# Patient Record
Sex: FEMALE | Race: BLACK OR AFRICAN AMERICAN | NOT HISPANIC OR LATINO | URBAN - METROPOLITAN AREA
[De-identification: names, ages, dates, MRNs, and addresses within clinical notes are randomized per-mention and may not be internally consistent; named-entity substitution may affect disease eponyms.]

---

## 2023-10-11 ENCOUNTER — LAB (OUTPATIENT)
Dept: LAB | Facility: LAB | Age: 73
End: 2023-10-11
Payer: COMMERCIAL

## 2023-10-11 DIAGNOSIS — E78.2 MIXED HYPERLIPIDEMIA: ICD-10-CM

## 2023-10-11 DIAGNOSIS — M06.9 RHEUMATOID ARTHRITIS, UNSPECIFIED (MULTI): Primary | ICD-10-CM

## 2023-10-11 PROCEDURE — 36415 COLL VENOUS BLD VENIPUNCTURE: CPT

## 2023-10-11 PROCEDURE — 80061 LIPID PANEL: CPT

## 2023-10-11 PROCEDURE — 80053 COMPREHEN METABOLIC PANEL: CPT

## 2023-10-11 PROCEDURE — 85025 COMPLETE CBC W/AUTO DIFF WBC: CPT

## 2023-10-12 LAB
ALBUMIN SERPL BCP-MCNC: 4.2 G/DL (ref 3.4–5)
ALP SERPL-CCNC: 104 U/L (ref 33–136)
ALT SERPL W P-5'-P-CCNC: 12 U/L (ref 7–45)
ANION GAP SERPL CALC-SCNC: 14 MMOL/L (ref 10–20)
AST SERPL W P-5'-P-CCNC: 12 U/L (ref 9–39)
BASOPHILS # BLD AUTO: 0.07 X10*3/UL (ref 0–0.1)
BASOPHILS NFR BLD AUTO: 0.9 %
BILIRUB SERPL-MCNC: 0.8 MG/DL (ref 0–1.2)
BUN SERPL-MCNC: 17 MG/DL (ref 6–23)
CALCIUM SERPL-MCNC: 9.4 MG/DL (ref 8.6–10.6)
CHLORIDE SERPL-SCNC: 101 MMOL/L (ref 98–107)
CHOLEST SERPL-MCNC: 257 MG/DL (ref 0–199)
CHOLESTEROL/HDL RATIO: 3.4
CO2 SERPL-SCNC: 26 MMOL/L (ref 21–32)
CREAT SERPL-MCNC: 0.47 MG/DL (ref 0.5–1.05)
EOSINOPHIL # BLD AUTO: 0.23 X10*3/UL (ref 0–0.4)
EOSINOPHIL NFR BLD AUTO: 2.8 %
ERYTHROCYTE [DISTWIDTH] IN BLOOD BY AUTOMATED COUNT: 14.1 % (ref 11.5–14.5)
GFR SERPL CREATININE-BSD FRML MDRD: >90 ML/MIN/1.73M*2
GLUCOSE SERPL-MCNC: 81 MG/DL (ref 74–99)
HCT VFR BLD AUTO: 41.8 % (ref 36–46)
HDLC SERPL-MCNC: 75.9 MG/DL
HGB BLD-MCNC: 13.1 G/DL (ref 12–16)
IMM GRANULOCYTES # BLD AUTO: 0.04 X10*3/UL (ref 0–0.5)
IMM GRANULOCYTES NFR BLD AUTO: 0.5 % (ref 0–0.9)
LDLC SERPL CALC-MCNC: 164 MG/DL (ref 140–190)
LYMPHOCYTES # BLD AUTO: 2.43 X10*3/UL (ref 0.8–3)
LYMPHOCYTES NFR BLD AUTO: 29.9 %
MCH RBC QN AUTO: 30.9 PG (ref 26–34)
MCHC RBC AUTO-ENTMCNC: 31.3 G/DL (ref 32–36)
MCV RBC AUTO: 99 FL (ref 80–100)
MONOCYTES # BLD AUTO: 0.78 X10*3/UL (ref 0.05–0.8)
MONOCYTES NFR BLD AUTO: 9.6 %
NEUTROPHILS # BLD AUTO: 4.57 X10*3/UL (ref 1.6–5.5)
NEUTROPHILS NFR BLD AUTO: 56.3 %
NON HDL CHOLESTEROL: 181 MG/DL (ref 0–149)
NRBC BLD-RTO: 0 /100 WBCS (ref 0–0)
PLATELET # BLD AUTO: 376 X10*3/UL (ref 150–450)
PMV BLD AUTO: 10.5 FL (ref 7.5–11.5)
POTASSIUM SERPL-SCNC: 4.1 MMOL/L (ref 3.5–5.3)
PROT SERPL-MCNC: 7.5 G/DL (ref 6.4–8.2)
RBC # BLD AUTO: 4.24 X10*6/UL (ref 4–5.2)
SODIUM SERPL-SCNC: 137 MMOL/L (ref 136–145)
TRIGL SERPL-MCNC: 88 MG/DL (ref 0–149)
VLDL: 18 MG/DL (ref 0–40)
WBC # BLD AUTO: 8.1 X10*3/UL (ref 4.4–11.3)

## 2024-08-07 ENCOUNTER — HOSPITAL ENCOUNTER (OUTPATIENT)
Dept: RADIOLOGY | Facility: CLINIC | Age: 74
Discharge: HOME | End: 2024-08-07
Payer: COMMERCIAL

## 2024-08-07 DIAGNOSIS — Z12.31 ENCOUNTER FOR SCREENING MAMMOGRAM FOR MALIGNANT NEOPLASM OF BREAST: ICD-10-CM

## 2024-08-07 PROCEDURE — 77067 SCR MAMMO BI INCL CAD: CPT

## 2025-05-28 ENCOUNTER — OFFICE VISIT (OUTPATIENT)
Dept: URGENT CARE | Age: 75
End: 2025-05-28
Payer: COMMERCIAL

## 2025-05-28 VITALS
SYSTOLIC BLOOD PRESSURE: 133 MMHG | WEIGHT: 193 LBS | TEMPERATURE: 97.9 F | OXYGEN SATURATION: 98 % | DIASTOLIC BLOOD PRESSURE: 84 MMHG | HEART RATE: 89 BPM | RESPIRATION RATE: 16 BRPM

## 2025-05-28 DIAGNOSIS — M25.561 ACUTE PAIN OF RIGHT KNEE: ICD-10-CM

## 2025-05-28 DIAGNOSIS — S83.91XA SPRAIN OF RIGHT KNEE, UNSPECIFIED LIGAMENT, INITIAL ENCOUNTER: Primary | ICD-10-CM

## 2025-05-28 PROCEDURE — 1125F AMNT PAIN NOTED PAIN PRSNT: CPT | Performed by: PHYSICIAN ASSISTANT

## 2025-05-28 PROCEDURE — 99203 OFFICE O/P NEW LOW 30 MIN: CPT | Performed by: PHYSICIAN ASSISTANT

## 2025-05-28 PROCEDURE — 1160F RVW MEDS BY RX/DR IN RCRD: CPT | Performed by: PHYSICIAN ASSISTANT

## 2025-05-28 PROCEDURE — 1159F MED LIST DOCD IN RCRD: CPT | Performed by: PHYSICIAN ASSISTANT

## 2025-05-28 RX ORDER — NALOXONE HYDROCHLORIDE 4 MG/.1ML
4 SPRAY NASAL
COMMUNITY
Start: 2024-09-04 | End: 2025-09-04

## 2025-05-28 RX ORDER — ALBUTEROL SULFATE 90 UG/1
AEROSOL, METERED RESPIRATORY (INHALATION)
COMMUNITY
Start: 2023-10-10

## 2025-05-28 RX ORDER — TIZANIDINE 4 MG/1
4 TABLET ORAL
COMMUNITY
Start: 2025-03-18

## 2025-05-28 RX ORDER — DULOXETIN HYDROCHLORIDE 60 MG/1
60 CAPSULE, DELAYED RELEASE ORAL NIGHTLY
COMMUNITY

## 2025-05-28 RX ORDER — ATORVASTATIN CALCIUM 40 MG/1
40 TABLET, FILM COATED ORAL DAILY
COMMUNITY

## 2025-05-28 RX ORDER — GABAPENTIN 300 MG/1
300 CAPSULE ORAL 2 TIMES DAILY
COMMUNITY

## 2025-05-28 RX ORDER — FOLIC ACID 0.8 MG
0.8 TABLET ORAL DAILY
COMMUNITY

## 2025-05-28 RX ORDER — METHOTREXATE 2.5 MG/1
15 TABLET ORAL
COMMUNITY

## 2025-05-28 RX ORDER — LORATADINE 10 MG/1
10 TABLET ORAL
COMMUNITY
Start: 2025-04-08 | End: 2025-07-07

## 2025-05-28 RX ORDER — IBUPROFEN 800 MG/1
800 TABLET, FILM COATED ORAL 2 TIMES DAILY
COMMUNITY
Start: 2023-11-08 | End: 2025-07-02

## 2025-05-28 RX ORDER — OXYCODONE AND ACETAMINOPHEN 5; 325 MG/1; MG/1
TABLET ORAL
COMMUNITY

## 2025-05-28 RX ORDER — LOTEPREDNOL ETABONATE 5 MG/ML
1 SUSPENSION/ DROPS OPHTHALMIC 4 TIMES DAILY
COMMUNITY
Start: 2024-05-29

## 2025-05-28 ASSESSMENT — ENCOUNTER SYMPTOMS
DEPRESSION: 0
LOSS OF SENSATION IN FEET: 0
OCCASIONAL FEELINGS OF UNSTEADINESS: 1

## 2025-05-28 ASSESSMENT — PATIENT HEALTH QUESTIONNAIRE - PHQ9
SUM OF ALL RESPONSES TO PHQ9 QUESTIONS 1 AND 2: 0
1. LITTLE INTEREST OR PLEASURE IN DOING THINGS: NOT AT ALL
2. FEELING DOWN, DEPRESSED OR HOPELESS: NOT AT ALL

## 2025-05-28 ASSESSMENT — PAIN SCALES - GENERAL: PAINLEVEL_OUTOF10: 10-WORST PAIN EVER

## 2025-05-28 NOTE — PATIENT INSTRUCTIONS
You were seen for knee pain    X-ray:   Pending.  I will notify you of any abnormal x-ray results.        Plan  1. Please go to the [facility] radiology department across the street for x-ray today.  2. Take Tylenol  as needed for pain.  3. Wear ace wrap or splint for comfort and support.  4. RICE therapy: Rest, ice (apply ice to area 4-5 times a day for 20 minutes at a time), compression, elevation.    5. Follow-up with orthopedics as soon as possible for continued pain longer than one week.  6. Please return to ER if you develop new or worsening symptoms.        Knee Pain/Strain:    Many general measures can help your symptoms. General measures include Modifying activities, Taking drugs that relieve pain, Applying heat or cold to the painful area, doing exercises. Bed rest, if required to relieve severe pain, should last no more than 1 or 2 days. Longer bed rest weakens the core muscles and increases stiffness, thus worsening back pain and prolonging recovery. Acetaminophen is usually recommended for pain relief unless inflammation is present. If inflammation is present, over-the-counter or prescription nonsteroidal anti-inflammatory drugs can relieve pain and reduce inflammation. Muscle relaxants, such as carisoprodol, cyclobenzaprine, diazepam, metaxalone, or methocarbamol, are sometimes given to relieve muscle spasms, but their usefulness is controversial. Application of heat or cold may help.  Cold is usually preferred to heat during the first 2 days after an injury. Ice and cold packs should not be applied directly to the skin. After the pain has subsided, light activity, as recommended by a doctor or physical therapist, can speed healing and recovery. In some cases, a course of treatment with a physical therapist can help. Specific exercises to strengthen and stretch the back and to strengthen core muscles are usually recommended to help prevent low back pain from becoming chronic or recurring. Other  preventive measures (maintaining good posture, using a medium mattress with appropriately placed pillows, lifting correctly) should be continued or started. In response to these measures, most episodes of back pain resolve in several days to 2 weeks. Regardless of treatment, 80 to 90% of such episodes resolve within 6 weeks      Knee pain is a common problem among athletes and the general population.    Etiology     There are many causes of pain in or around the knee in athletes, particularly runners, including    ° Subluxation of the patella when bending the knee  ° Chondromalacia of the undersurface of the patella (runner´s knee, which is softening of the knee cap cartilage)  ° Intra-articular pathology, such as meniscal tears and plicae (infolding of the normal synovial lining of the knee)  ° Fat pad inflammation  ° Stress fractures of the tibia  ° Malalignment of the lower extremities  ° Patellar (or infrapatellar) tendinitis (jumper´s knee, which is an overuse injury to the patellar tendon at the attachment to the lower pole of the patella)      Knee pain may be referred from the lumbar spine or hip or result from foot problems (eg, excessive pronation or rolling inward of the foot during walking or running).       Diagnosis   ° History and physical examination  ° Sometimes imaging tests      Diagnosis requires a thorough review of the injured athlete´s training program, including a history of symptom onset and aggravating factors, and a complete lower-extremity examination (for knee examination, see Approach to the Patient With Joint Disease: Physical Examination and see Knee Pain and Meniscal Injuries).     Mechanical symptoms, such as locking or catching, suggest an internal derangement of the knee such as a meniscal tear. Instability symptoms, such as giving way and loss of confidence in the extremity when twisting or turning on the knee, suggest ligamentous injury or subluxation of the patella.      Chondromalacia is suggested by anterior knee pain after running, especially on hills, as well as pain and stiffness after sitting for any length of time (positive movie sign). On examination, pain is typically reproduced by compression of the patella against the femur.     Pain that becomes worse with weight-bearing suggests a stress fracture.    Treatment   ° Quadriceps-strengthening exercises  ° Sometimes stabilizing pads, supports, or braces  ° Nonsteroidal anti-inflammatory drugs (NSAIDs)      Treatment is tailored to the specific cause of the pain.     Treatment of chondromalacia includes quadriceps-strengthening exercises with balanced strengthening exercises for the hamstrings, use of arch supports if excessive pronation is a possible contributor, and use of NSAIDs.     For patellar subluxation, use of patella-stabilizing pads or braces may be necessary, especially in sports that require rapid, agile movements in various planes (eg, basketball, tennis).     If there is excessive pronation of the foot, and all other possible causes of knee pain have been excluded, use of an orthotic insert is sometimes useful.     Stress fractures require rest and cessation of weight-bearing activity.     Intra-articular pathology often requires surgery.

## 2025-05-28 NOTE — PROGRESS NOTES
Subjective   Patient ID: Soraya Thakkar is a 74 y.o. female. They present today with a chief complaint of Knee Pain (Right knee pain).    CC: Right knee pain    HPI: This is a 74-year-old female presenting for right knee pain over the past 2 weeks.  Pain is dull, achy, generalized, worsened with walking.  Patient does have a history of a total knee replacement that was performed probably 2 years ago.  Patient reports that the knee keeps giving out causing her to fall.  Denies hip and back pain.  No head injury or neck injury.  No paresthesias Consensi deficits or weakness.The injury was not associated with any skin lacerations or bleeding. No distal neurologic abnormalities such as numbness, tingling, or weakness.                                                                                                                                                                                                                                                                                                                                                                                                                                                                                                                                                                                                                                                                                                                                                                                                                                                                                                                                                                                                                                                                                                                                                                                                                                                                                                                                                           No bowel or bladder dysfunction, saddle anesthesia, paresthesias, sensory deficits or weakness.  No fever.  No abdominal pain.  No chest pain or shortness of breath.      Past Medical History  Allergies as of 05/28/2025 - Reviewed 05/28/2025   Allergen Reaction Noted    Tree and shrub pollen Hives 04/22/2021       Prescriptions Prior to Admission[1]    Medical History[2]    Surgical History[3]    Alcohol use questions deferred to the physician. Drug use questions deferred to the physician.    Review of Systems  Review of Systems    After reviewing all body systems I have documented pertinent findings above in the history.  All other Systems reviewed and are negative for complaint.  Pertinent positive and negatives are listed in the above HPI.    Objective    Vitals:    05/28/25 1524   BP: 133/84   Pulse: 89   Resp: 16   Temp: 36.6 °C (97.9 °F)   SpO2: 98%   Weight: 87.5 kg (193 lb)     No LMP recorded. Patient is postmenopausal.    Physical Exam  General: No acute distress. Well developed, well nourished.   Skin: Skin is warm, and dry. No rashes or lesions. Nailbeds pink.  Neck: Supple.   Cardiac: Regular rate  Respiratory:  No acute respiratory distress.  Regular rate of breathing    MSK: Generalized tenderness localized to the right knee.  Positive for vertical scar.  Well-healed.  BL knees show no outward evidence of deformities, swelling, bruising, effusion or erythema.No tenderness with patellar compression.  Good flexion and extension. Negative MCL/LCL laxity. Negative anterior/posterior draw test.     Good plantar and dorsiflexion.   No sensory deficits  DP pulse 2+ bilaterally.    Hip and ankle are normal. No tenderness noted. Good range of motion and strength.    Neurological: A&Ox4. Normal speech, steady gait.   Psych:  Normal affect.  Cooperative.    Procedures  Point of Care Test & Imaging Results from this  visit    Imaging  No results found.    Cardiology, Vascular, and Other Imaging  No other imaging results found for the past 2 days    Diagnostic study results (if any) were reviewed by Cordell Francois PA-C.  Assessment/Plan   Allergies, medications, history, and pertinent labs/EKGs/Imaging reviewed by Cordell Francois PA-C.     MDM: Patient presenting for right knee pain x 2 weeks.  Reports that he has been giving out.  Reports falling on it.  History of knee replacement 2 years ago.  Patient reports that she has orthopedic physician that she is able to follow-up with.    X-ray: Pending    No MCL/LCL laxity. Negative anterior/posterior draw test. Joints above and below are normal. Compartments are all soft. No calf tenderness or edema. No sensory deficits and DP pulses is intact. Good plantar and dorsiflexion.      Clinical findings are less suggestive of fracture, dislocation, ligament tear, infectious or neurovascular causes. Symptoms most likely due to knee sprain. Counseled patient of findings and diagnosis.  Patient will head to  facility for x-ray today.  Advised symptomatic treatment with ace wrap, Tylenol, and NSIDS. Advised to follow up with orthopedics if pain does not improve over the next 2-3 days.  Declined orthopedic referral.  Patient reports that she has an orthopedic physician.  Pt/family instructed to return if symptoms worsen or if new symptoms develop. Patient/family expressed understanding and consented to the above plan. No barriers of communication were apparent and I answered all questions.      Orders and Diagnoses  Diagnoses and all orders for this visit:  Sprain of right knee, unspecified ligament, initial encounter  Acute pain of right knee  -     XR knee right 4+ views; Future    Medical Admin Record      Patient disposition: Home    Electronically signed by Cordell Francois PA-C  5:12 PM         [1] (Not in a hospital admission)   [2] History reviewed. No pertinent past medical  history.  [3] History reviewed. No pertinent surgical history.

## 2025-05-29 ENCOUNTER — ANCILLARY PROCEDURE (OUTPATIENT)
Dept: URGENT CARE | Age: 75
End: 2025-05-29
Payer: COMMERCIAL

## 2025-05-29 DIAGNOSIS — M25.561 ACUTE PAIN OF RIGHT KNEE: ICD-10-CM

## 2025-06-19 ENCOUNTER — HOSPITAL ENCOUNTER (OUTPATIENT)
Dept: RADIOLOGY | Facility: HOSPITAL | Age: 75
Discharge: HOME | End: 2025-06-19
Payer: COMMERCIAL

## 2025-06-19 DIAGNOSIS — Z77.120 CONTACT WITH AND (SUSPECTED) EXPOSURE TO MOLD (TOXIC): ICD-10-CM

## 2025-06-19 PROCEDURE — 71046 X-RAY EXAM CHEST 2 VIEWS: CPT

## 2025-06-19 PROCEDURE — 71046 X-RAY EXAM CHEST 2 VIEWS: CPT | Performed by: RADIOLOGY
